# Patient Record
Sex: FEMALE | Race: WHITE | NOT HISPANIC OR LATINO | URBAN - METROPOLITAN AREA
[De-identification: names, ages, dates, MRNs, and addresses within clinical notes are randomized per-mention and may not be internally consistent; named-entity substitution may affect disease eponyms.]

---

## 2017-04-27 ENCOUNTER — IMPORTED ENCOUNTER (OUTPATIENT)
Dept: URBAN - METROPOLITAN AREA CLINIC 31 | Facility: CLINIC | Age: 22
End: 2017-04-27

## 2017-04-27 PROCEDURE — 92014 COMPRE OPH EXAM EST PT 1/>: CPT

## 2017-04-27 NOTE — PATIENT DISCUSSION
1.  Refractive error - Glasses change optional. 2.  Return for an appointment in 1 year for comprehensive exam. with Dr. Neno Patel.

## 2019-04-17 ENCOUNTER — IMPORTED ENCOUNTER (OUTPATIENT)
Dept: URBAN - METROPOLITAN AREA CLINIC 31 | Facility: CLINIC | Age: 24
End: 2019-04-17

## 2019-04-17 PROCEDURE — 92014 COMPRE OPH EXAM EST PT 1/>: CPT

## 2019-04-17 PROCEDURE — 92015 DETERMINE REFRACTIVE STATE: CPT

## 2019-04-17 NOTE — PATIENT DISCUSSION
1.  Refractive error - Glasses change optional. 2.  Return for an appointment in 1 year for comprehensive exam. with Dr. Judith Davis.

## 2021-03-02 ENCOUNTER — IMPORTED ENCOUNTER (OUTPATIENT)
Dept: URBAN - METROPOLITAN AREA CLINIC 31 | Facility: CLINIC | Age: 26
End: 2021-03-02

## 2021-03-02 PROBLEM — G43.B0: Noted: 2021-03-02

## 2021-03-02 PROCEDURE — 92014 COMPRE OPH EXAM EST PT 1/>: CPT

## 2021-03-02 PROCEDURE — 92015 DETERMINE REFRACTIVE STATE: CPT

## 2021-03-02 NOTE — PATIENT DISCUSSION
1.  Opthalmic Migraine - Discussed opthalmic migraine auras and possibilty of avoiding triggers. Continue to follow with primary medical provider. 2. Refractive error - SV rx especially for near work. 3. Return for an appointment in 1 year for comprehensive exam. with Dr. Taylor Purcell.

## 2021-08-04 ENCOUNTER — OFFICE VISIT (OUTPATIENT)
Dept: URBAN - METROPOLITAN AREA CLINIC 63 | Facility: CLINIC | Age: 26
End: 2021-08-04

## 2021-08-24 ENCOUNTER — OFFICE VISIT (OUTPATIENT)
Dept: URBAN - METROPOLITAN AREA TELEHEALTH 2 | Facility: TELEHEALTH | Age: 26
End: 2021-08-24

## 2021-11-30 ENCOUNTER — OFFICE VISIT (OUTPATIENT)
Dept: URBAN - METROPOLITAN AREA TELEHEALTH 2 | Facility: TELEHEALTH | Age: 26
End: 2021-11-30

## 2021-12-14 ENCOUNTER — OFFICE VISIT (OUTPATIENT)
Dept: URBAN - METROPOLITAN AREA TELEHEALTH 2 | Facility: TELEHEALTH | Age: 26
End: 2021-12-14

## 2022-03-08 ENCOUNTER — IMPORTED ENCOUNTER (OUTPATIENT)
Dept: URBAN - METROPOLITAN AREA CLINIC 31 | Facility: CLINIC | Age: 27
End: 2022-03-08

## 2022-03-08 PROBLEM — R51: Noted: 2022-03-08

## 2022-03-08 PROCEDURE — 99214 OFFICE O/P EST MOD 30 MIN: CPT

## 2022-03-08 PROCEDURE — 92015 DETERMINE REFRACTIVE STATE: CPT

## 2022-03-08 NOTE — PATIENT DISCUSSION
1.  Headache of non-ocular origin - Patient has headache that cannot be explained by ocular exam and testing. Continue to follow with managing medical practitioner. 2. Refractive Error- Change glasses. 3. Return for an appointment in 1 year for comprehensive exam. with Dr. Raymond Chance.

## 2022-03-08 NOTE — PATIENT DISCUSSION
1. Refractive Error- Change glasses. 2. Return for an appointment in 1 year for comprehensive exam. with Dr. Carla Holt.

## 2022-04-01 ASSESSMENT — TONOMETRY
OD_IOP_MMHG: 17
OS_IOP_MMHG: 15
OS_IOP_MMHG: 16
OD_IOP_MMHG: 14
OD_IOP_MMHG: 17
OS_IOP_MMHG: 17
OD_IOP_MMHG: 17
OS_IOP_MMHG: 17

## 2022-04-01 ASSESSMENT — VISUAL ACUITY
OD_SC: J1+14''
OD_CC: 20/20
OS_SC: J1+
OS_SC: 20/20
OD_CC: 20/20-1
OS_SC: J1+14''
OS_CC: J1+
OS_CC: 20/20-1
OD_CC: 20/15-1
OD_CC: J1+
OD_SC: 20/20
OD_SC: J1+
OS_CC: 20/20
OS_CC: 20/20+2

## 2022-07-09 ENCOUNTER — TELEPHONE ENCOUNTER (OUTPATIENT)
Dept: URBAN - METROPOLITAN AREA CLINIC 121 | Facility: CLINIC | Age: 27
End: 2022-07-09

## 2022-07-09 RX ORDER — NORGESTIMATE AND ETHINYL ESTRADIOL
KIT TAKE AS DIRECTED
Refills: 0 | OUTPATIENT
Start: 2021-07-17 | End: 2021-08-24

## 2022-07-09 RX ORDER — BENZONATATE 100 MG/1
CAPSULE ORAL ONCE A DAY
Refills: 0 | OUTPATIENT
Start: 2021-08-21 | End: 2021-08-24

## 2022-07-09 RX ORDER — LINACLOTIDE 145 UG/1
ONCE A DAY CAPSULE, GELATIN COATED ORAL ONCE A DAY
Refills: 0 | OUTPATIENT
Start: 2021-08-11 | End: 2021-08-24

## 2022-07-09 RX ORDER — NORGESTIMATE AND ETHINYL ESTRADIOL
KIT
Refills: 0 | OUTPATIENT
Start: 2021-10-10 | End: 2021-12-14

## 2022-07-09 RX ORDER — LINACLOTIDE 145 UG/1
TWICE A DAY CAPSULE, GELATIN COATED ORAL TWICE A DAY
Refills: 2 | OUTPATIENT
Start: 2021-08-24 | End: 2021-08-24

## 2022-07-10 ENCOUNTER — TELEPHONE ENCOUNTER (OUTPATIENT)
Dept: URBAN - METROPOLITAN AREA CLINIC 121 | Facility: CLINIC | Age: 27
End: 2022-07-10

## 2022-07-10 RX ORDER — BENZONATATE 100 MG/1
CAPSULE ORAL THREE TIMES A DAY
Refills: 0 | Status: ACTIVE | COMMUNITY
Start: 2021-08-24

## 2022-07-10 RX ORDER — LINACLOTIDE 145 UG/1
ONCE A DAY CAPSULE, GELATIN COATED ORAL ONCE A DAY
Refills: 2 | Status: ACTIVE | COMMUNITY
Start: 2021-09-20

## 2022-07-10 RX ORDER — NORGESTIMATE AND ETHINYL ESTRADIOL
KIT TAKE AS DIRECTED
Refills: 0 | Status: ACTIVE | COMMUNITY
Start: 2021-08-24

## 2022-07-10 RX ORDER — NORGESTIMATE AND ETHINYL ESTRADIOL
KIT ONCE A DAY
Refills: 0 | Status: ACTIVE | COMMUNITY
Start: 2021-12-14

## 2023-04-28 ENCOUNTER — WEB ENCOUNTER (OUTPATIENT)
Dept: URBAN - METROPOLITAN AREA CLINIC 60 | Facility: CLINIC | Age: 28
End: 2023-04-28

## 2023-05-04 ENCOUNTER — OFFICE VISIT (OUTPATIENT)
Dept: URBAN - METROPOLITAN AREA CLINIC 60 | Facility: CLINIC | Age: 28
End: 2023-05-04
Payer: COMMERCIAL

## 2023-05-04 VITALS
SYSTOLIC BLOOD PRESSURE: 120 MMHG | TEMPERATURE: 98.2 F | HEIGHT: 67 IN | WEIGHT: 186 LBS | RESPIRATION RATE: 20 BRPM | BODY MASS INDEX: 29.19 KG/M2 | OXYGEN SATURATION: 99 % | DIASTOLIC BLOOD PRESSURE: 70 MMHG | HEART RATE: 111 BPM

## 2023-05-04 DIAGNOSIS — K59.00 CONSTIPATION, UNSPECIFIED CONSTIPATION TYPE: ICD-10-CM

## 2023-05-04 PROBLEM — 14760008: Status: ACTIVE | Noted: 2023-05-04

## 2023-05-04 PROCEDURE — 99214 OFFICE O/P EST MOD 30 MIN: CPT | Performed by: NURSE PRACTITIONER

## 2023-05-04 RX ORDER — NORGESTIMATE AND ETHINYL ESTRADIOL
KIT TAKE AS DIRECTED
Refills: 0 | Status: ACTIVE | COMMUNITY
Start: 2021-08-24

## 2023-05-04 NOTE — HPI-HPI
8/21 Patient with chronic constipation with bowel moments in 12 days. Will start Fiber, exercise, water. Had MiraLAX with no improvement, will start Linzess. Patient's family, her mother had similar symptoms. Will start Linzess 290 daily. Denies rectal bleeding, Does complains of bloating, will do referral to colorectal for w/u of constipation. 5/23 Patient here today complaining having constipation, increasing gases and abdominal discomfort. Patient has medical history of chronic constipation, on Linzess 290 mcg.  She said Linzess does not work for her anymore she have bowel movements every 10 or 20 days.  Last time she had a bowel movement was 21 days ago.  Patient will use Fleet enemas and start on lactulose.  Patient advised that if she has no good results with laxative she wants to go to the ER department. Colonoscopy.

## 2023-05-04 NOTE — PHYSICAL EXAM GASTROINTESTINAL
Abdomen: Soft, diffuse tendernes, none distended, no guarding or rigidity, no masses palpable, normal bowel sounds, no hepatosplenomegaly.

## 2023-05-11 ENCOUNTER — WEB ENCOUNTER (OUTPATIENT)
Dept: URBAN - METROPOLITAN AREA CLINIC 60 | Facility: CLINIC | Age: 28
End: 2023-05-11

## 2023-05-11 ENCOUNTER — LAB OUTSIDE AN ENCOUNTER (OUTPATIENT)
Dept: URBAN - METROPOLITAN AREA CLINIC 60 | Facility: CLINIC | Age: 28
End: 2023-05-11

## 2023-05-11 RX ORDER — LACTULOSE 20 G/20G
1 PACKET AS NEEDED POWDER, FOR SOLUTION ORAL ONCE A DAY
Qty: 30 | OUTPATIENT
Start: 2023-05-12 | End: 2023-06-11

## 2023-05-16 ENCOUNTER — WEB ENCOUNTER (OUTPATIENT)
Dept: URBAN - METROPOLITAN AREA CLINIC 60 | Facility: CLINIC | Age: 28
End: 2023-05-16

## 2023-05-16 RX ORDER — LACTULOSE 10 G/15ML
15 ML AS NEEDED SOLUTION ORAL ONCE A DAY
Qty: 450 | OUTPATIENT
Start: 2023-05-18 | End: 2023-06-17

## 2023-06-01 ENCOUNTER — WEB ENCOUNTER (OUTPATIENT)
Dept: URBAN - METROPOLITAN AREA CLINIC 60 | Facility: CLINIC | Age: 28
End: 2023-06-01

## 2023-07-14 ENCOUNTER — OUT OF OFFICE VISIT (OUTPATIENT)
Dept: URBAN - METROPOLITAN AREA SURGERY CENTER 4 | Facility: SURGERY CENTER | Age: 28
End: 2023-07-14
Payer: COMMERCIAL

## 2023-07-14 DIAGNOSIS — K59.00 CONSTIPATION, UNSPECIFIED CONSTIPATION TYPE: ICD-10-CM

## 2023-07-14 DIAGNOSIS — K64.1 GRADE II HEMORRHOIDS: ICD-10-CM

## 2023-07-14 DIAGNOSIS — K64.1 GRADE II INTERNAL HEMORRHOIDS: ICD-10-CM

## 2023-07-14 PROCEDURE — 00812 ANES LWR INTST SCR COLSC: CPT | Performed by: NURSE ANESTHETIST, CERTIFIED REGISTERED

## 2023-07-14 PROCEDURE — 45378 DIAGNOSTIC COLONOSCOPY: CPT | Performed by: INTERNAL MEDICINE

## 2023-07-14 RX ORDER — LACTULOSE 10 G/15ML
15 ML AS NEEDED SOLUTION ORAL TWICE A DAY
Qty: 900 ML | Refills: 1 | OUTPATIENT
Start: 2023-07-14 | End: 2023-09-12

## 2023-07-14 RX ORDER — NORGESTIMATE AND ETHINYL ESTRADIOL
KIT TAKE AS DIRECTED
Refills: 0 | Status: ACTIVE | COMMUNITY
Start: 2021-08-24

## 2023-07-14 RX ORDER — LACTULOSE 20 G/20G
1 PACKET AS NEEDED POWDER, FOR SOLUTION ORAL TWICE A DAY
Qty: 60 | Refills: 1 | OUTPATIENT
Start: 2023-05-12

## 2023-08-03 ENCOUNTER — WEB ENCOUNTER (OUTPATIENT)
Dept: URBAN - METROPOLITAN AREA CLINIC 63 | Facility: CLINIC | Age: 28
End: 2023-08-03

## 2023-08-07 ENCOUNTER — DASHBOARD ENCOUNTERS (OUTPATIENT)
Age: 28
End: 2023-08-07

## 2023-08-07 ENCOUNTER — WEB ENCOUNTER (OUTPATIENT)
Dept: URBAN - METROPOLITAN AREA CLINIC 63 | Facility: CLINIC | Age: 28
End: 2023-08-07

## 2023-08-07 ENCOUNTER — OFFICE VISIT (OUTPATIENT)
Dept: URBAN - METROPOLITAN AREA CLINIC 63 | Facility: CLINIC | Age: 28
End: 2023-08-07
Payer: COMMERCIAL

## 2023-08-07 VITALS
WEIGHT: 193 LBS | HEART RATE: 79 BPM | BODY MASS INDEX: 30.29 KG/M2 | SYSTOLIC BLOOD PRESSURE: 110 MMHG | DIASTOLIC BLOOD PRESSURE: 82 MMHG | OXYGEN SATURATION: 99 % | TEMPERATURE: 97.4 F | HEIGHT: 67 IN

## 2023-08-07 DIAGNOSIS — K64.1 GRADE II INTERNAL HEMORRHOIDS: ICD-10-CM

## 2023-08-07 DIAGNOSIS — K59.04 CHRONIC IDIOPATHIC CONSTIPATION: ICD-10-CM

## 2023-08-07 PROCEDURE — 99214 OFFICE O/P EST MOD 30 MIN: CPT | Performed by: NURSE PRACTITIONER

## 2023-08-07 RX ORDER — NORGESTIMATE AND ETHINYL ESTRADIOL
KIT TAKE AS DIRECTED
Refills: 0 | Status: ACTIVE | COMMUNITY
Start: 2021-08-24

## 2023-08-07 RX ORDER — LACTULOSE 20 G/20G
1 PACKET AS NEEDED POWDER, FOR SOLUTION ORAL TWICE A DAY
Qty: 60 | Refills: 1 | Status: ACTIVE | COMMUNITY
Start: 2023-05-12

## 2023-08-07 RX ORDER — LIDOCAINE HYDROCHLORIDE AND HYDROCORTISONE ACETATE 30; 25 MG/G; MG/G
1 APPLICATORFUL GEL RECTAL TWICE A DAY
Qty: 60 | OUTPATIENT
Start: 2023-08-07 | End: 2023-09-06

## 2023-08-07 RX ORDER — LACTULOSE 10 G/15ML
15 ML AS NEEDED SOLUTION ORAL TWICE A DAY
Qty: 900 ML | Refills: 1 | Status: ACTIVE | COMMUNITY
Start: 2023-07-14 | End: 2023-09-12

## 2023-08-07 NOTE — HPI-HPI
8/21 Patient with chronic constipation with bowel moments in 12 days. Will start Fiber, exercise, water. Had MiraLAX with no improvement, will start Linzess. Patient's family, her mother had similar symptoms. Will start Linzess 290 daily. Denies rectal bleeding, Does complains of bloating, will do referral to colorectal for w/u of constipation. 5/23 Patient here today complaining having constipation, increasing gases and abdominal discomfort. Patient has medical history of chronic constipation, on Linzess 290 mcg.  She said Linzess does not work for her anymore she has bowel movements every 10 or 20 days.  Last time she had a bowel movement was 21 days ago.  Patient will use Fleet enemas and start on lactulose.  Patient advised that if she has no good results with laxative she wants to go to the ER department. Colonoscopy. 8/23 Patient colonoscopy shows evidence of internal hemorrhoids grade 2.  Patient here complaining of constipation.  Patient is on stool softener and 15 mL of lactulose twice a day.  Having bowel movements every 2 days.  She complains of small amount of hematochezia last week for 2 times.  Last bowel movement 2 days ago. Patient will have topical treatment for hemorrhoids we will try Trulance 3 mg daily.  Increase activity fluid and fiber intake.

## 2023-08-14 ENCOUNTER — RX ONLY (OUTPATIENT)
Age: 28
Setting detail: RX ONLY
End: 2023-08-14

## 2023-09-07 ENCOUNTER — APPOINTMENT (RX ONLY)
Dept: URBAN - METROPOLITAN AREA CLINIC 114 | Facility: CLINIC | Age: 28
Setting detail: DERMATOLOGY
End: 2023-09-07

## 2023-09-07 DIAGNOSIS — B07.8 OTHER VIRAL WARTS: ICD-10-CM

## 2023-09-07 DIAGNOSIS — L55.9 SUNBURN, UNSPECIFIED: ICD-10-CM

## 2023-09-07 DIAGNOSIS — Z71.89 OTHER SPECIFIED COUNSELING: ICD-10-CM

## 2023-09-07 PROCEDURE — 99202 OFFICE O/P NEW SF 15 MIN: CPT | Mod: 25

## 2023-09-07 PROCEDURE — ? COUNSELING

## 2023-09-07 PROCEDURE — ? IMMUNOTHERAPY: CANDIDA ANTIGEN

## 2023-09-07 PROCEDURE — 11900 INJECT SKIN LESIONS </W 7: CPT

## 2023-09-07 PROCEDURE — ? TREATMENT REGIMEN

## 2023-09-07 PROCEDURE — ? ADDITIONAL NOTES

## 2023-09-07 ASSESSMENT — LOCATION ZONE DERM
LOCATION ZONE: FACE
LOCATION ZONE: TRUNK
LOCATION ZONE: FINGER

## 2023-09-07 ASSESSMENT — LOCATION SIMPLE DESCRIPTION DERM
LOCATION SIMPLE: RIGHT MIDDLE FINGER
LOCATION SIMPLE: CHEST
LOCATION SIMPLE: LEFT CHEEK
LOCATION SIMPLE: RIGHT CHEEK

## 2023-09-07 ASSESSMENT — LOCATION DETAILED DESCRIPTION DERM
LOCATION DETAILED: MIDDLE STERNUM
LOCATION DETAILED: RIGHT PROXIMAL PALMAR MIDDLE FINGER
LOCATION DETAILED: LEFT INFERIOR CENTRAL MALAR CHEEK
LOCATION DETAILED: RIGHT INFERIOR CENTRAL MALAR CHEEK

## 2023-09-07 NOTE — PROCEDURE: IMMUNOTHERAPY: CANDIDA ANTIGEN
Consent was obtained from the patient. The risks of candida antigen injection were discussed in detail. Specifically, the risks of redness, itching, pain and allergic reactions were addressed. Prior to injection all of the patient's questions were answered.
J-Code Units: 1
Bill ?: Yes
Ndc: [id number]
Detail Level: Simple
Expiration Date (Optional): [id number]
Lot # (Optional): 
Total Amount Injected In Ccs: 0.3
Post-Care Instructions: I reviewed with the patient in detail post-care instructions. Mild to moderate itching, tenderness, or swelling at the injection site is common and usually lasts 24 to 48 hours. The patient may elevate the painful area, apply ice for 5 minutes at a time, take tylenol every 4 to 6 hours. Only 5% of Candida immunotherapy patients get flu-like symptoms. This usually lasts only 24 to 48 hours. It is possible to prevent this at future visits by taking tylenol before the injection. If warts are on the fingers, all rings should be removed for 2 days post treatment. The patient understands that multiple treatments may be needed and there is no gaurantee of improvement.
Render Post-Care Instructions In Note?: no

## 2023-09-07 NOTE — PROCEDURE: ADDITIONAL NOTES
Additional Notes: Gabriel Youssef [phone] [phone]
Render Risk Assessment In Note?: no
Detail Level: Detailed
Additional Notes: Advised to put Vaseline in the fridge and apply to the skin as patient states she had an allergic reaction to aloe

## 2023-09-27 ENCOUNTER — APPOINTMENT (RX ONLY)
Dept: URBAN - METROPOLITAN AREA CLINIC 114 | Facility: CLINIC | Age: 28
Setting detail: DERMATOLOGY
End: 2023-09-27

## 2023-09-27 DIAGNOSIS — B07.8 OTHER VIRAL WARTS: ICD-10-CM

## 2023-09-27 DIAGNOSIS — L29.89 OTHER PRURITUS: ICD-10-CM

## 2023-09-27 PROBLEM — L29.8 OTHER PRURITUS: Status: ACTIVE | Noted: 2023-09-27

## 2023-09-27 PROCEDURE — ? COUNSELING

## 2023-09-27 PROCEDURE — 99212 OFFICE O/P EST SF 10 MIN: CPT | Mod: 25

## 2023-09-27 PROCEDURE — ? LIQUID NITROGEN

## 2023-09-27 PROCEDURE — 17110 DESTRUCTION B9 LES UP TO 14: CPT

## 2023-09-27 ASSESSMENT — LOCATION SIMPLE DESCRIPTION DERM: LOCATION SIMPLE: RIGHT MIDDLE FINGER

## 2023-09-27 ASSESSMENT — LOCATION DETAILED DESCRIPTION DERM: LOCATION DETAILED: RIGHT DISTAL PALMAR MIDDLE FINGER

## 2023-09-27 ASSESSMENT — LOCATION ZONE DERM: LOCATION ZONE: FINGER

## 2023-09-27 NOTE — PROCEDURE: LIQUID NITROGEN
Show Aperture Variable?: Yes
Add 52 Modifier (Optional): no
Medical Necessity Clause: patient wears glasses and this lesion gets in the way of his glasses.
Topical Anesthesia Type: liquid nitrogen
Number Of Freeze-Thaw Cycles: 2 freeze-thaw cycles
Spray Paint Text: The liquid nitrogen was applied to the skin utilizing a spray paint frosting technique.
Detail Level: Simple
Medical Necessity Information: It is in your best interest to select a reason for this procedure from the list below. All of these items fulfill various CMS LCD requirements except the new and changing color options.
Post-Care Instructions: I reviewed with the patient in detail post-care instructions. Patient is to wear sunprotection, and avoid picking at any of the treated lesions. Pt may apply Vaseline to crusted or scabbing areas.
Consent: The patient's consent was obtained including but not limited to risks of crusting, scabbing, blistering, scarring, darker or lighter pigmentary change, recurrence, incomplete removal and infection.
Duration Of Freeze Thaw-Cycle (Seconds): 5-10

## 2023-10-19 ENCOUNTER — APPOINTMENT (RX ONLY)
Dept: URBAN - METROPOLITAN AREA CLINIC 114 | Facility: CLINIC | Age: 28
Setting detail: DERMATOLOGY
End: 2023-10-19

## 2023-10-19 DIAGNOSIS — L90.5 SCAR CONDITIONS AND FIBROSIS OF SKIN: ICD-10-CM

## 2023-10-19 DIAGNOSIS — B07.8 OTHER VIRAL WARTS: ICD-10-CM | Status: RESOLVED

## 2023-10-19 DIAGNOSIS — L71.8 OTHER ROSACEA: ICD-10-CM | Status: INADEQUATELY CONTROLLED

## 2023-10-19 PROCEDURE — ? COUNSELING

## 2023-10-19 PROCEDURE — 99214 OFFICE O/P EST MOD 30 MIN: CPT

## 2023-10-19 PROCEDURE — ? INVENTORY

## 2023-10-19 PROCEDURE — ? PRESCRIPTION

## 2023-10-19 PROCEDURE — ? PRESCRIPTION MEDICATION MANAGEMENT

## 2023-10-19 RX ORDER — BENZOYL PEROXIDE 50 MG/G
1 CREAM TOPICAL QAM
Qty: 30 | Refills: 3 | Status: ERX | COMMUNITY
Start: 2023-10-19

## 2023-10-19 RX ADMIN — BENZOYL PEROXIDE 1: 50 CREAM TOPICAL at 00:00

## 2023-10-19 ASSESSMENT — LOCATION SIMPLE DESCRIPTION DERM
LOCATION SIMPLE: LEFT CHEEK
LOCATION SIMPLE: RIGHT MIDDLE FINGER
LOCATION SIMPLE: RIGHT CHEEK

## 2023-10-19 ASSESSMENT — SEVERITY ASSESSMENT OVERALL AMONG ALL PATIENTS
IN YOUR EXPERIENCE, AMONG ALL PATIENTS YOU HAVE SEEN WITH THIS CONDITION, HOW SEVERE IS THIS PATIENT'S CONDITION?: MILD TO MODERATE

## 2023-10-19 ASSESSMENT — LOCATION DETAILED DESCRIPTION DERM
LOCATION DETAILED: RIGHT DISTAL RADIAL PALMAR MIDDLE FINGER
LOCATION DETAILED: RIGHT INFERIOR CENTRAL MALAR CHEEK
LOCATION DETAILED: LEFT CENTRAL MALAR CHEEK
LOCATION DETAILED: RIGHT DISTAL PALMAR MIDDLE FINGER

## 2023-10-19 ASSESSMENT — LOCATION ZONE DERM
LOCATION ZONE: FINGER
LOCATION ZONE: FACE